# Patient Record
Sex: MALE | URBAN - METROPOLITAN AREA
[De-identification: names, ages, dates, MRNs, and addresses within clinical notes are randomized per-mention and may not be internally consistent; named-entity substitution may affect disease eponyms.]

---

## 2020-11-26 ENCOUNTER — NURSE TRIAGE (OUTPATIENT)
Dept: NURSING | Facility: CLINIC | Age: 35
End: 2020-11-26

## 2020-11-26 NOTE — TELEPHONE ENCOUNTER
Naseem is having covid symptoms and is scheduled for a test on Saturday, earliest they could get one done. In the meantime he is having swelling on the  Right side of his face under his eye and on cheek and has some sinus pain and ear pain with it.  They have tried benedryl about 2 hours ago and do not see a lot of improvement yet. Will watch and if it worsens will go to ED but if the same or improves will call for an appointment at their clinic in the am. He agrees to this plan.      Additional Information    [1] MILD eyelid swelling (puffiness) AND [2] sinus pain or pressure    MODERATE-SEVERE eyelid swelling on one side  (Exception: due to a mosquito bite)    Protocols used: EYE - SWELLING-A-AH